# Patient Record
Sex: FEMALE | Race: WHITE | NOT HISPANIC OR LATINO | URBAN - METROPOLITAN AREA
[De-identification: names, ages, dates, MRNs, and addresses within clinical notes are randomized per-mention and may not be internally consistent; named-entity substitution may affect disease eponyms.]

---

## 2018-04-06 ENCOUNTER — TELEPHONE (OUTPATIENT)
Dept: PRIMARY CARE CLINIC | Facility: CLINIC | Age: 43
End: 2018-04-06

## 2018-04-12 ENCOUNTER — OFFICE VISIT (OUTPATIENT)
Dept: PRIMARY CARE CLINIC | Facility: CLINIC | Age: 43
End: 2018-04-12
Payer: COMMERCIAL

## 2018-04-12 VITALS
RESPIRATION RATE: 12 BRPM | OXYGEN SATURATION: 97 % | HEIGHT: 69 IN | DIASTOLIC BLOOD PRESSURE: 72 MMHG | HEART RATE: 86 BPM | SYSTOLIC BLOOD PRESSURE: 135 MMHG | WEIGHT: 235.25 LBS | BODY MASS INDEX: 34.84 KG/M2 | TEMPERATURE: 99 F

## 2018-04-12 DIAGNOSIS — L65.9 HAIR LOSS DISORDER: ICD-10-CM

## 2018-04-12 DIAGNOSIS — E66.9 OBESITY (BMI 30-39.9): ICD-10-CM

## 2018-04-12 DIAGNOSIS — F41.3 OTHER MIXED ANXIETY DISORDERS: ICD-10-CM

## 2018-04-12 DIAGNOSIS — F40.243 FEAR OF FLYING: ICD-10-CM

## 2018-04-12 DIAGNOSIS — E03.9 ACQUIRED HYPOTHYROIDISM: ICD-10-CM

## 2018-04-12 DIAGNOSIS — Z00.00 ANNUAL PHYSICAL EXAM: Primary | ICD-10-CM

## 2018-04-12 DIAGNOSIS — Z79.890 CURRENT LONG-TERM USE OF POSTMENOPAUSAL HORMONE REPLACEMENT THERAPY: ICD-10-CM

## 2018-04-12 PROBLEM — F41.9 ANXIETY: Status: ACTIVE | Noted: 2018-04-12

## 2018-04-12 PROCEDURE — 99999 PR PBB SHADOW E&M-EST. PATIENT-LVL IV: CPT | Mod: PBBFAC,,, | Performed by: NURSE PRACTITIONER

## 2018-04-12 PROCEDURE — 99386 PREV VISIT NEW AGE 40-64: CPT | Mod: S$GLB,,, | Performed by: NURSE PRACTITIONER

## 2018-04-12 RX ORDER — BUSPIRONE HYDROCHLORIDE 5 MG/1
5 TABLET ORAL 3 TIMES DAILY PRN
Qty: 90 TABLET | Refills: 1 | Status: SHIPPED | OUTPATIENT
Start: 2018-04-12 | End: 2019-04-15

## 2018-04-12 RX ORDER — BUSPIRONE HYDROCHLORIDE 5 MG/1
5 TABLET ORAL
Refills: 1 | COMMUNITY
Start: 2018-03-15 | End: 2018-04-12 | Stop reason: SDUPTHER

## 2018-04-12 RX ORDER — LEVOTHYROXINE SODIUM 50 UG/1
50 TABLET ORAL DAILY
Refills: 1 | COMMUNITY
Start: 2018-01-05 | End: 2018-04-12 | Stop reason: SDUPTHER

## 2018-04-12 RX ORDER — ALPRAZOLAM 0.25 MG/1
0.25 TABLET ORAL 2 TIMES DAILY PRN
Qty: 60 TABLET | Refills: 0 | Status: SHIPPED | OUTPATIENT
Start: 2018-04-12 | End: 2019-04-15 | Stop reason: SDUPTHER

## 2018-04-12 RX ORDER — CHOLECALCIFEROL (VITAMIN D3) 25 MCG
5000 TABLET ORAL DAILY
COMMUNITY

## 2018-04-12 RX ORDER — ESTRADIOL 1 MG/1
1 TABLET ORAL DAILY
Refills: 10 | COMMUNITY
Start: 2018-03-07 | End: 2021-03-29

## 2018-04-12 RX ORDER — SPIRONOLACTONE 100 MG/1
100 TABLET, FILM COATED ORAL DAILY
Refills: 11 | COMMUNITY
Start: 2018-01-15

## 2018-04-12 RX ORDER — DUTASTERIDE 0.5 MG/1
0.5 CAPSULE, LIQUID FILLED ORAL DAILY
Refills: 11 | COMMUNITY
Start: 2018-03-15

## 2018-04-12 RX ORDER — LEVOTHYROXINE SODIUM 50 UG/1
50 TABLET ORAL DAILY
Qty: 90 TABLET | Refills: 2 | Status: SHIPPED | OUTPATIENT
Start: 2018-04-12

## 2018-04-12 NOTE — PROGRESS NOTES
Subjective:       Patient ID: Nirmala Hernadez is a 43 y.o. female.    Chief Complaint: Establish Care    Patient presents today requesting to re-establish as a primary care patient and undergo annual physical exam.  She is well known to this provider and was previously followed in [place] .  She reports no acute complaints.  She reports recent labs ordered by ERICK Edmondson NP in [facility].  She will obtain and send for her chart here.  Her immunizations are up to date. She is in need of several refills on her routine medications as well as her PRN Xanax that she uses when she is scheduled to fly.  Her last refill on Xanax was June 2017.     Followed by Roula Jorgensen MD - hair restoration   Dr. Gipson - Gyn       Review of Systems   All other systems reviewed and are negative.     General: no fatigue, no fever, no chills  Skin: no rashes, no itching, no changes in skin texture  Head: no trauma, no scalp tenderness  Eyes: no vision changes, no drainage, no itching, no redness  Ears: no hearing changes, no drainage  Nose: no congestion, no drainage  Throat: no soreness, no hoarseness, no difficulty swallowing  Neck: no swelling, no pain, no stiffness  Chest: no cough, no shortness of breath, no PND, no NESS, no wheezing  CV: no chest pain, no palpitations, no racing/slow heart, no edema   GI: no abdominal pain, no constipation, no diarrhea, no nausea, no vomiting, no changes in appetite  : no burning with urination, no frequency, no hematuria, no nocturia, no incontinence, no discharge, no odor or itch  Musculoskeletal: no decrease in ROM, no pain  Neuro: no dizziness, no headaches, no limb weakness, no numbness or tingling, no syncope, no changes in memory   Endocrine: no heat or cold intolerance, no unintended weight gain/loss, no hot flashes + thinning scalp hair   Hematological:  No lymphadenopathy, no bruising  Psychological: no depression, + anxiety, no sleep disturbance, no thoughts of suicide or homicide     Objective:       Physical Exam   Constitutional: She is oriented to person, place, and time. She appears well-developed and well-nourished. No distress.   HENT:   Head: Normocephalic and atraumatic.   Right Ear: External ear normal.   Left Ear: External ear normal.   Nose: Nose normal.   Mouth/Throat: Oropharynx is clear and moist. No oropharyngeal exudate.   Eyes: Conjunctivae and EOM are normal. Pupils are equal, round, and reactive to light. Right eye exhibits no discharge. Left eye exhibits no discharge. No scleral icterus.   Neck: Normal range of motion. Neck supple. No tracheal deviation present. No thyromegaly present.   Cardiovascular: Normal rate, regular rhythm, normal heart sounds and intact distal pulses.  Exam reveals no gallop and no friction rub.    No murmur heard.  Pulmonary/Chest: Effort normal and breath sounds normal. No respiratory distress.   Abdominal: Soft. Bowel sounds are normal. She exhibits no distension. There is no tenderness.   Musculoskeletal: Normal range of motion. She exhibits no edema, tenderness or deformity.   Lymphadenopathy:     She has no cervical adenopathy.   Neurological: She is alert and oriented to person, place, and time. She displays normal reflexes. No cranial nerve deficit. Coordination normal.   Skin: Skin is warm and dry. Capillary refill takes less than 2 seconds. No rash noted. She is not diaphoretic. No erythema. No pallor.   Psychiatric: She has a normal mood and affect. Her behavior is normal. Judgment and thought content normal.   Nursing note and vitals reviewed.      Assessment:       1. Annual physical exam    2. Other mixed anxiety disorders    3. Fear of flying    4. Acquired hypothyroidism    5. Current long-term use of postmenopausal hormone replacement therapy    6. Hair loss disorder    7. Obesity (BMI 30-39.9)        Plan:       Annual physical exam  Over all doing well.  Continue current home medication regimen.  Obtain labs and fax for review.  Continue TLCs to  decrease BMI including diet and exercise modifications.   Other mixed anxiety disorders  -     ALPRAZolam (XANAX) 0.25 MG tablet; Take 1 tablet (0.25 mg total) by mouth 2 (two) times daily as needed for Anxiety.  Dispense: 60 tablet; Refill: 0  -     busPIRone (BUSPAR) 5 MG Tab; Take 1 tablet (5 mg total) by mouth 3 (three) times daily as needed (anxiety).  Dispense: 90 tablet; Refill: 1  Sedation precautions reinforced regarding use of Xanax.  Pt. Is not to use Buspar and Xanax together. Daily anxiety is well controlled on Bupar PRN.    Fear of flying  -     ALPRAZolam (XANAX) 0.25 MG tablet; Take 1 tablet (0.25 mg total) by mouth 2 (two) times daily as needed for Anxiety.  Dispense: 60 tablet; Refill: 0  Take only when scheduled to Fly and travel.  Risks of addictive medication properties reinforced to patient.    Acquired hypothyroidism  -     levothyroxine (SYNTHROID) 50 MCG tablet; Take 1 tablet (50 mcg total) by mouth once daily.  Dispense: 90 tablet; Refill: 2  Continue currents home medication regimen.   Current long-term use of postmenopausal hormone replacement therapy  F/u with Gyn as scheduled.   Hair loss disorder  F/u with Dr. Jorgensen as scheduled.  Improved hair growth since last saw in clinic.      Obesity     continue [facility] BMI goal set between 19-25.     I have reviewed the patient's past medical/surgical and social histories and updated as appropriate. Medications were reviewed and discussed as appropriate including side effects and risks versus benefit. Plan of care was reviewed and agreed upon with the patient.  An opportunity to ask questions was provided and explanation given. Patient verbalized understanding on all information reviewed and discussed.  The patient will follow up 6 months or sooner if needed.

## 2018-04-12 NOTE — PATIENT INSTRUCTIONS
4 Steps for Eating Healthier  Changing the way you eat can improve your health. It can lower your cholesterol and blood pressure, and help you stay at a healthy weight. Your diet doesnt have to be bland and boring to be healthy. Just watch your calories and follow these steps:    1. Eat fewer unhealthy fats  · Choose more fish and lean meats instead of fatty cuts of meat.  · Skip butter and lard, and use less margarine.  · Pass on foods that have palm, coconut, or hydrogenated oils.  · Eat fewer high-fat dairy foods like cheese, ice cream, and whole milk.  · Get a heart-healthy cookbook and try some low-fat recipes.  2. Go light on salt  · Keep the saltshaker off the table.  · Limit high-salt ingredients, such as soy sauce, bouillon, and garlic salt.  · Instead of adding salt when cooking, season your food with herbs and flavorings. Try lemon, garlic, and onion.  · Limit convenience foods, such as boxed or canned foods and restaurant food.  · Read food labels and choose lower-sodium options.  3. Limit sugar  · Pause before you add sugars to pancakes, cereal, coffee, or tea. This includes white and brown table sugar, syrup, honey, and molasses. Cut your usual amount by half.  · Use non-sugar sweeteners. Stevia, aspartame, and sucralose can satisfy a sweet tooth without adding calories.  · Swap out sugar-filled soda and other drinks. Buy sugar-free or low-calorie beverages. Remember water is always the best choice.  · Read labels and choose foods with less added sugar. Keep in mind that dairy foods and foods with fruit will have some natural sugar.  · Cut the sugar in recipes by 1/3 to 1/2. Boost the flavor with extracts like almond, vanilla, or orange. Or add spices such as cinnamon or nutmeg.  4. Eat more fiber  · Eat fresh fruits and vegetables every day.  · Boost your diet with whole grains. Go for oats, whole-grain rice, and bran.  · Add beans and lentils to your meals.  · Drink more water to match your fiber  increase. This is to help prevent constipation.  Date Last Reviewed: 5/11/2015  © 5521-9766 [employer]. [address] [place] [address], [place] [address]. All rights reserved. This information is not intended as a substitute for professional medical care. Always follow your healthcare professional's instructions.

## 2019-01-25 DIAGNOSIS — Z12.39 BREAST CANCER SCREENING: ICD-10-CM

## 2019-02-08 DIAGNOSIS — Z12.39 BREAST CANCER SCREENING: ICD-10-CM

## 2019-02-15 DIAGNOSIS — Z12.39 BREAST CANCER SCREENING: ICD-10-CM

## 2019-04-01 ENCOUNTER — TELEPHONE (OUTPATIENT)
Dept: ADMINISTRATIVE | Facility: HOSPITAL | Age: 44
End: 2019-04-01

## 2019-04-01 ENCOUNTER — PATIENT OUTREACH (OUTPATIENT)
Dept: ADMINISTRATIVE | Facility: HOSPITAL | Age: 44
End: 2019-04-01

## 2019-04-01 DIAGNOSIS — Z13.220 ENCOUNTER FOR LIPID SCREENING FOR CARDIOVASCULAR DISEASE: ICD-10-CM

## 2019-04-01 DIAGNOSIS — Z13.6 ENCOUNTER FOR LIPID SCREENING FOR CARDIOVASCULAR DISEASE: ICD-10-CM

## 2019-04-01 NOTE — PROGRESS NOTES
Immunizations reviewed. Legacy reviewed. Order placed for Lipid Panel. Placed call to patient to discuss health maintenance. Patient advised her GYN is Dr. Gipson in [place]. And he orders her Mammogram which most recent Mammogram was done approx a month ago. Patient states that she is to do blood work prior to her appt on 04/15. Health Maintenance updated. EFAX sent to Dr. Gipson to obtain recent mammogram. Pre-visit chart review completed.

## 2019-04-01 NOTE — TELEPHONE ENCOUNTER
Placed call to patient during pre-visit chart review. Patient has not had any recent labs. Please advise if you want any additional labs ordered. Patient is wanting to do blood work before her appt with you on 04/15.

## 2019-04-01 NOTE — LETTER
April 1, 2019    Dr. Chalo Gipson             [facility]   [address] [address]  P: [phone]  F: [phone] April 1, 2019     Patient: Nirmala Hernadez    YOB: 1975   Date of Visit: 4/1/2019         [facility]    We are seeing Nirmala Hernadez in the clinic today at [facility].  Qing Summers, JESSICA, NP-C is their PCP.  She/He has an outstanding lab/procedure at this time when reviewing their chart.  To help with our Health Maintenance records will you please supply the following:      [x]  Mammogram                                        []  Colonoscopy   [x]  Pap Smear                                           [x]  Outside Lab Result   []  Dexa scans                                          []  Eye Exam   []  Foot Exam                                           [] Other___________   []  Outside Immunizations                                               Please Fax to [facility] at [phone].    Thank you for your help, GALILEO Alexander.  If I can be of any assistance you can call at [phone]

## 2019-04-02 ENCOUNTER — PATIENT MESSAGE (OUTPATIENT)
Dept: PRIMARY CARE CLINIC | Facility: CLINIC | Age: 44
End: 2019-04-02

## 2019-04-02 ENCOUNTER — TELEPHONE (OUTPATIENT)
Dept: PRIMARY CARE CLINIC | Facility: CLINIC | Age: 44
End: 2019-04-02

## 2019-04-02 DIAGNOSIS — E66.9 OBESITY (BMI 30-39.9): ICD-10-CM

## 2019-04-02 DIAGNOSIS — E03.9 ACQUIRED HYPOTHYROIDISM: ICD-10-CM

## 2019-04-02 DIAGNOSIS — Z00.00 ANNUAL PHYSICAL EXAM: ICD-10-CM

## 2019-04-02 DIAGNOSIS — F41.9 ANXIETY: ICD-10-CM

## 2019-04-02 DIAGNOSIS — Z79.890 CURRENT LONG-TERM USE OF POSTMENOPAUSAL HORMONE REPLACEMENT THERAPY: Primary | ICD-10-CM

## 2019-04-02 RX ORDER — MINOXIDIL 2.5 MG/1
TABLET ORAL
Refills: 3 | COMMUNITY
Start: 2019-01-21

## 2019-04-02 NOTE — TELEPHONE ENCOUNTER
----- Message from Qing Summers DNP, NP-C sent at 4/2/2019  9:12 AM CDT -----  Good morning,     Please call patient and let her know her lab orders have been placed for her upcoming visit.  Please ask where she would like them faxed to or sent so she can have them completed before her visit.  Thanks.

## 2019-04-15 ENCOUNTER — OFFICE VISIT (OUTPATIENT)
Dept: PRIMARY CARE CLINIC | Facility: CLINIC | Age: 44
End: 2019-04-15
Attending: NURSE PRACTITIONER
Payer: COMMERCIAL

## 2019-04-15 VITALS
HEIGHT: 69 IN | WEIGHT: 234.44 LBS | HEART RATE: 83 BPM | SYSTOLIC BLOOD PRESSURE: 128 MMHG | BODY MASS INDEX: 34.72 KG/M2 | OXYGEN SATURATION: 99 % | TEMPERATURE: 99 F | RESPIRATION RATE: 16 BRPM | DIASTOLIC BLOOD PRESSURE: 61 MMHG

## 2019-04-15 DIAGNOSIS — R74.01 ELEVATED ALT MEASUREMENT: ICD-10-CM

## 2019-04-15 DIAGNOSIS — E03.9 ACQUIRED HYPOTHYROIDISM: ICD-10-CM

## 2019-04-15 DIAGNOSIS — E66.9 OBESITY (BMI 30-39.9): ICD-10-CM

## 2019-04-15 DIAGNOSIS — F40.243 FEAR OF FLYING: ICD-10-CM

## 2019-04-15 DIAGNOSIS — F40.10 SOCIAL ANXIETY DISORDER: ICD-10-CM

## 2019-04-15 DIAGNOSIS — Z00.00 ANNUAL PHYSICAL EXAM: Primary | ICD-10-CM

## 2019-04-15 PROCEDURE — 99396 PREV VISIT EST AGE 40-64: CPT | Mod: S$GLB,,, | Performed by: NURSE PRACTITIONER

## 2019-04-15 PROCEDURE — 99396 PR PREVENTIVE VISIT,EST,40-64: ICD-10-PCS | Mod: S$GLB,,, | Performed by: NURSE PRACTITIONER

## 2019-04-15 PROCEDURE — 99999 PR PBB SHADOW E&M-EST. PATIENT-LVL IV: CPT | Mod: PBBFAC,,, | Performed by: NURSE PRACTITIONER

## 2019-04-15 PROCEDURE — 99999 PR PBB SHADOW E&M-EST. PATIENT-LVL IV: ICD-10-PCS | Mod: PBBFAC,,, | Performed by: NURSE PRACTITIONER

## 2019-04-15 RX ORDER — DESVENLAFAXINE SUCCINATE 50 MG/1
50 TABLET, EXTENDED RELEASE ORAL DAILY
Qty: 30 TABLET | Refills: 0 | Status: SHIPPED | OUTPATIENT
Start: 2019-04-15 | End: 2019-07-15

## 2019-04-15 RX ORDER — ALPRAZOLAM 0.25 MG/1
0.25 TABLET ORAL 2 TIMES DAILY PRN
Qty: 60 TABLET | Refills: 0 | Status: SHIPPED | OUTPATIENT
Start: 2019-04-15 | End: 2021-03-29

## 2019-04-15 RX ORDER — DESVENLAFAXINE SUCCINATE 50 MG/1
100 TABLET, EXTENDED RELEASE ORAL DAILY
Qty: 30 TABLET | Refills: 1 | Status: SHIPPED | OUTPATIENT
Start: 2019-04-15 | End: 2019-04-15 | Stop reason: CLARIF

## 2019-04-15 NOTE — PATIENT INSTRUCTIONS
Desvenlafaxine extended-release tablets  What is this medicine?  DESVENLAFAXINE (denice MARYJO la FAX een) is used to treat depression.  How should I use this medicine?  Take this medicine by mouth with a drink of water. Do not crush, cut or chew. Follow the directions on the prescription label. Take your doses at regular intervals. Do not take your medicine more often than directed. Do not stop taking this medicine suddenly except upon the advice of your doctor. Stopping this medicine too quickly may cause serious side effects or your condition may worsen.  Contact your pediatrician or health care professional regarding the use of this medicine in children. Special care may be needed.  A special MedGuide will be given to you by the pharmacist with each prescription and refill. Be sure to read this information carefully each time.  What side effects may I notice from receiving this medicine?  Side effects that you should report to your doctor or health care professional as soon as possible:  · allergic reactions like skin rash, itching or hives, swelling of the face, lips, or tongue  · hallucination, loss of contact with reality  · arjun (over-active behavior)  · increase in blood pressure  · redness, blistering, peeling or loosening of the skin, including inside the mouth  · seizures  · sexual difficulties (abnormal ejaculation or orgasm)  · unusual bleeding or bruising  · vomiting  Side effects that usually do not require medical attention (report to your doctor or health care professional if they continue or are bothersome):  · constipation  · difficulty sleeping  · dizziness, drowsiness  · dry mouth  · increased sweating  · loss of appetite  · nausea  · tremor  What may interact with this medicine?  Do not take this medicine with any of the following medications:  · duloxetine  · linezolid  · MAOIs like Azilect, Carbex, Eldepryl, Marplan, Nardil, and Parnate  · methylene blue (injected into a  vein)  · venlafaxine  This medicine may also interact with the following medications:  · alcohol  · amphetamine  · aspirin and aspirin-like medicines  · certain migraine headache medicines (almotriptan, eletriptan, frovatriptan, naratriptan, rizatriptan, sumatriptan, zolmitriptan)  · dexfenfluramine or fenfluramine  · dextroamphetamine  · furazolidone  · isoniazid  · lithium  · medicines for heart rhythm or blood pressure  · medicines that treat or prevent blood clots like warfarin, enoxaparin, and dalteparin  · methylphenidate  · metoclopramide  · NSAIDS, medicines for pain and inflammation, like ibuprofen or naproxen  · pentazocine  · phentermine  · procarbazine  · protriptyline  · sibutramine  · Michelle's wort, Manuelito perforatum  · tramadol  · tryptophan  · zolpidem  What if I miss a dose?  If you miss a dose, take it as soon as you can. If it is almost time for your next dose, take only that dose. Do not take double or extra doses.  Where should I keep my medicine?  Keep out of the reach of children.  Store at room temperature between 15 and 30 degrees C (59 and 86 degrees F). Throw away any unused medicine after the expiration date.  What should I tell my health care provider before I take this medicine?  They need to know if you have any of these conditions:  · glaucoma  · high blood pressure  · kidney disease  · liver disease  · arjun or bipolar disorder  · suicidal thoughts or a previous suicide attempt  · an unusual reaction to desvenlafaxine, venlafaxine, other medicines, foods, dyes, or preservatives  · pregnant or trying to get pregnant  · breast-feeding  What should I watch for while using this medicine?  Tell your doctor if your symptoms do not get better or if they get worse. Visit your doctor or health care professional for regular checks on your progress. Because it may take several weeks to see the full effects of this medicine, it is important to continue your treatment as prescribed by your  doctor.  Patients and their families should watch out for new or worsening thoughts of suicide or depression. Also watch out for sudden changes in feelings such as feeling anxious, agitated, panicky, irritable, hostile, aggressive, impulsive, severely restless, overly excited and hyperactive, or not being able to sleep. If this happens, especially at the beginning of treatment or after a change in dose, call your health care professional.  This medicine can cause an increase in blood pressure. Check with your doctor for instructions on monitoring your blood pressure while taking this medicine.  You may get drowsy or dizzy. Do not drive, use machinery, or do anything that needs mental alertness until you know how this medicine affects you. Do not stand or sit up quickly, especially if you are an older patient. This reduces the risk of dizzy or fainting spells. Alcohol may interfere with the effect of this medicine. Avoid alcoholic drinks.  Your mouth may get dry. Chewing sugarless gum, sucking hard candy and drinking plenty of water will help. Contact your doctor if the problem does not go away or is severe.  NOTE:This sheet is a summary. It may not cover all possible information. If you have questions about this medicine, talk to your doctor, pharmacist, or health care provider. Copyright© 2017 [employer]

## 2019-04-15 NOTE — PROGRESS NOTES
Subjective:       Patient ID: Nirmala Hernadez is a 44 y.o. female.    Chief Complaint: Annual Exam    Patient is a 44 year old female who presents to clinic today for annual physical exam.  She did undergo labs prior to this exam and we will review those today.  Her health and wellness screening is up to date with the exception of a dental appointment which she will make.  She denies any acute complaints but does report she is not feeling her anxiety is very well controlled on her as needed Buspar and would like a medication change. She reports over the weekend she had a situation where she was in a group setting and her anxiety was overwhelming.  She states she notices in social situations she feels more anxious despite taking the Buspar routinely.  She also reports she is about to go on vacation and will be flying and is in need of a refill on her Xanax.  Her last refill was last April 2018.  She has been working on diet and has lost 12 pounds in the last month on Keto.  She is physically active.  Followed by Roula Jorgensen MD - hair restoration   Dr. Gipson - Gyn       Review of Systems   Constitutional: Negative.    HENT: Negative.    Eyes: Negative.    Respiratory: Negative.    Cardiovascular: Negative.    Endocrine: Negative.    Genitourinary: Negative.    Musculoskeletal: Negative.    Skin: Negative.    Allergic/Immunologic: Negative.    Hematological: Negative.    Psychiatric/Behavioral: Positive for decreased concentration. The patient is nervous/anxious.    All other systems reviewed and are negative.      Objective:      Physical Exam   Constitutional: She is oriented to person, place, and time. She appears well-developed and well-nourished. No distress.   HENT:   Head: Normocephalic and atraumatic.   Right Ear: External ear normal.   Left Ear: External ear normal.   Nose: Nose normal.   Mouth/Throat: Oropharynx is clear and moist. No oropharyngeal exudate.   Eyes: Pupils are equal, round, and reactive to  light. Conjunctivae and EOM are normal. Right eye exhibits no discharge. Left eye exhibits no discharge. No scleral icterus.   Neck: Normal range of motion. Neck supple. No thyromegaly present.   Cardiovascular: Normal rate, regular rhythm, normal heart sounds and intact distal pulses. Exam reveals no gallop and no friction rub.   No murmur heard.  Pulmonary/Chest: Effort normal and breath sounds normal. No respiratory distress.   Abdominal: Soft. Bowel sounds are normal. She exhibits no distension and no mass. There is no tenderness. There is no guarding.   Musculoskeletal: Normal range of motion. She exhibits no edema, tenderness or deformity.   Lymphadenopathy:     She has no cervical adenopathy.   Neurological: She is alert and oriented to person, place, and time. No cranial nerve deficit. Coordination normal.   Skin: Skin is warm and dry. Capillary refill takes less than 2 seconds. No rash noted. She is not diaphoretic. No erythema.   Psychiatric: She has a normal mood and affect. Her behavior is normal. Judgment and thought content normal.   Nursing note and vitals reviewed.      Medication List with Changes/Refills   New Medications    DESVENLAFAXINE SUCCINATE (PRISTIQ) 50 MG TB24    Take 1 tablet (50 mg total) by mouth once daily.   Current Medications    DUTASTERIDE (AVODART) 0.5 MG CAPSULE    Take 0.5 mg by mouth once daily.    ESTRADIOL (ESTRACE) 1 MG TABLET    Take 1 mg by mouth once daily.    LEVOTHYROXINE (SYNTHROID) 50 MCG TABLET    Take 1 tablet (50 mcg total) by mouth once daily.    MINOXIDIL (LONITEN) 2.5 MG TABLET    TK 1/4 TO 1/2 T PO D    SPIRONOLACTONE (ALDACTONE) 100 MG TABLET    Take 100 mg by mouth once daily.    VITAMIN D 1000 UNITS TAB    Take 5,000 Units by mouth once daily.   Changed and/or Refilled Medications    Modified Medication Previous Medication    ALPRAZOLAM (XANAX) 0.25 MG TABLET ALPRAZolam (XANAX) 0.25 MG tablet       Take 1 tablet (0.25 mg total) by mouth 2 (two) times daily  as needed for Anxiety.    Take 1 tablet (0.25 mg total) by mouth 2 (two) times daily as needed for Anxiety.   Discontinued Medications    BUSPIRONE (BUSPAR) 5 MG TAB    Take 1 tablet (5 mg total) by mouth 3 (three) times daily as needed (anxiety).     Labs from 04/11/19 reviewed and are as follows:     CBC - wnl   Hemoglobin A1c 5.5  CMP normal except: Glucose 105, Sodium 134, ALT 70  , TG 83, HDl 57, LDL 95  TSH 3.22  T4 1.36  UA - wnl   Assessment:       1. Annual physical exam    2. Acquired hypothyroidism    3. Fear of flying    4. Obesity (BMI 30-39.9)    5. Social anxiety disorder    6. Elevated ALT measurement        Plan:       Annual physical exam  Over all doing well.  Encouraged to increase efforts to decrease weight and BMI including diet modification and increasing physical activity.  Suggest counting calories and decreasing to 1800 calories daily.    Acquired hypothyroidism  TFT stable although TSH upper limits of normal.  Will continue to monitor.  Pt. To continue to same medication regimen.      Fear of flying  -     ALPRAZolam (XANAX) 0.25 MG tablet; Take 1 tablet (0.25 mg total) by mouth 2 (two) times daily as needed for Anxiety.  Dispense: 60 tablet; Refill: 0  Refill provided.  Patient counseled on risks associated with routine benzodiazapine use and potential for abuse.  She is using it when traveling.      Obesity (BMI 30-39.9)  Encouraged [facility] BMI goal set at 25.    Social anxiety disorder  Will provide trial of Pristiq.  Side effects discussed.  F/u in 12 weeks or sooner if needed.  Stop Buspar.    Elevated ALT measurement  Pt. Reports Gyn is monitoring ALT.  Discussed likely fatty liver.  Will continue to monitor as well.  Other LFTS wnl.    Other orders  -     desvenlafaxine succinate (PRISTIQ) 50 MG Tb24; Take 1 tablet (50 mg total) by mouth once daily.  Dispense: 30 tablet; Refill: 0          Follow up in about 3 months (around 7/15/2019) for med check .    I have reviewed the  patient's past medical/surgical and social histories and updated as appropriate. Medications were reviewed and discussed as appropriate including side effects and risks versus benefit. Plan of care was reviewed and agreed upon with the patient.  An opportunity to ask questions was provided and explanation given. Patient verbalized understanding on all information reviewed and discussed.

## 2019-07-15 ENCOUNTER — PATIENT MESSAGE (OUTPATIENT)
Dept: PRIMARY CARE CLINIC | Facility: CLINIC | Age: 44
End: 2019-07-15

## 2019-07-15 DIAGNOSIS — F41.9 ANXIETY: Primary | ICD-10-CM

## 2019-07-15 RX ORDER — SERTRALINE HYDROCHLORIDE 25 MG/1
25 TABLET, FILM COATED ORAL DAILY
Qty: 30 TABLET | Refills: 3 | Status: SHIPPED | OUTPATIENT
Start: 2019-07-15 | End: 2019-10-16 | Stop reason: SDUPTHER

## 2019-10-16 DIAGNOSIS — F41.9 ANXIETY: ICD-10-CM

## 2019-10-16 RX ORDER — SERTRALINE HYDROCHLORIDE 25 MG/1
25 TABLET, FILM COATED ORAL DAILY
Qty: 90 TABLET | Refills: 3 | Status: SHIPPED | OUTPATIENT
Start: 2019-10-16 | End: 2021-03-29

## 2021-03-23 ENCOUNTER — TELEPHONE (OUTPATIENT)
Dept: GASTROENTEROLOGY | Facility: CLINIC | Age: 46
End: 2021-03-23

## 2021-03-26 ENCOUNTER — PATIENT MESSAGE (OUTPATIENT)
Dept: HEPATOLOGY | Facility: CLINIC | Age: 46
End: 2021-03-26

## 2021-03-29 ENCOUNTER — PROCEDURE VISIT (OUTPATIENT)
Dept: HEPATOLOGY | Facility: CLINIC | Age: 46
End: 2021-03-29
Payer: COMMERCIAL

## 2021-03-29 ENCOUNTER — OFFICE VISIT (OUTPATIENT)
Dept: HEPATOLOGY | Facility: CLINIC | Age: 46
End: 2021-03-29
Payer: COMMERCIAL

## 2021-03-29 VITALS
BODY MASS INDEX: 35.04 KG/M2 | SYSTOLIC BLOOD PRESSURE: 144 MMHG | OXYGEN SATURATION: 99 % | HEART RATE: 66 BPM | TEMPERATURE: 98 F | HEIGHT: 69 IN | RESPIRATION RATE: 20 BRPM | DIASTOLIC BLOOD PRESSURE: 81 MMHG | WEIGHT: 236.56 LBS

## 2021-03-29 DIAGNOSIS — R74.8 ELEVATED LIVER ENZYMES: Primary | ICD-10-CM

## 2021-03-29 DIAGNOSIS — R74.01 ELEVATED ALT MEASUREMENT: ICD-10-CM

## 2021-03-29 DIAGNOSIS — K76.0 NAFLD (NONALCOHOLIC FATTY LIVER DISEASE): ICD-10-CM

## 2021-03-29 PROCEDURE — 99999 PR PBB SHADOW E&M-EST. PATIENT-LVL IV: CPT | Mod: PBBFAC,,, | Performed by: NURSE PRACTITIONER

## 2021-03-29 PROCEDURE — 1126F AMNT PAIN NOTED NONE PRSNT: CPT | Mod: S$GLB,,, | Performed by: NURSE PRACTITIONER

## 2021-03-29 PROCEDURE — 99999 PR PBB SHADOW E&M-EST. PATIENT-LVL IV: ICD-10-PCS | Mod: PBBFAC,,, | Performed by: NURSE PRACTITIONER

## 2021-03-29 PROCEDURE — 99204 PR OFFICE/OUTPT VISIT, NEW, LEVL IV, 45-59 MIN: ICD-10-PCS | Mod: S$GLB,,, | Performed by: NURSE PRACTITIONER

## 2021-03-29 PROCEDURE — 91200 FIBROSCAN (VIBRATION CONTROLLED TRANSIENT ELASTOGRAPHY): ICD-10-PCS | Mod: S$GLB,,, | Performed by: NURSE PRACTITIONER

## 2021-03-29 PROCEDURE — 3008F PR BODY MASS INDEX (BMI) DOCUMENTED: ICD-10-PCS | Mod: CPTII,S$GLB,, | Performed by: NURSE PRACTITIONER

## 2021-03-29 PROCEDURE — 91200 LIVER ELASTOGRAPHY: CPT | Mod: S$GLB,,, | Performed by: NURSE PRACTITIONER

## 2021-03-29 PROCEDURE — 3008F BODY MASS INDEX DOCD: CPT | Mod: CPTII,S$GLB,, | Performed by: NURSE PRACTITIONER

## 2021-03-29 PROCEDURE — 1126F PR PAIN SEVERITY QUANTIFIED, NO PAIN PRESENT: ICD-10-PCS | Mod: S$GLB,,, | Performed by: NURSE PRACTITIONER

## 2021-03-29 PROCEDURE — 99204 OFFICE O/P NEW MOD 45 MIN: CPT | Mod: S$GLB,,, | Performed by: NURSE PRACTITIONER

## 2021-03-29 RX ORDER — FLUTICASONE PROPIONATE 50 MCG
1 SPRAY, SUSPENSION (ML) NASAL 2 TIMES DAILY
COMMUNITY
Start: 2021-01-22